# Patient Record
Sex: FEMALE | Race: WHITE | NOT HISPANIC OR LATINO | URBAN - NONMETROPOLITAN AREA
[De-identification: names, ages, dates, MRNs, and addresses within clinical notes are randomized per-mention and may not be internally consistent; named-entity substitution may affect disease eponyms.]

---
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
## 2025-03-10 SDOH — HEALTH STABILITY: PHYSICAL HEALTH: ON AVERAGE, HOW MANY DAYS PER WEEK DO YOU ENGAGE IN MODERATE TO STRENUOUS EXERCISE (LIKE A BRISK WALK)?: 6 DAYS

## 2025-03-10 SDOH — HEALTH STABILITY: PHYSICAL HEALTH: ON AVERAGE, HOW MANY MINUTES DO YOU ENGAGE IN EXERCISE AT THIS LEVEL?: 30 MIN

## 2025-03-10 ASSESSMENT — SOCIAL DETERMINANTS OF HEALTH (SDOH): HOW OFTEN DO YOU GET TOGETHER WITH FRIENDS OR RELATIVES?: MORE THAN THREE TIMES A WEEK

## 2025-03-11 ENCOUNTER — OFFICE VISIT (OUTPATIENT)
Dept: FAMILY MEDICINE | Facility: OTHER | Age: 20
End: 2025-03-11
Attending: PHYSICIAN ASSISTANT
Payer: COMMERCIAL

## 2025-03-11 VITALS
BODY MASS INDEX: 28.71 KG/M2 | SYSTOLIC BLOOD PRESSURE: 122 MMHG | DIASTOLIC BLOOD PRESSURE: 78 MMHG | OXYGEN SATURATION: 99 % | TEMPERATURE: 97.5 F | WEIGHT: 168.2 LBS | RESPIRATION RATE: 16 BRPM | HEIGHT: 64 IN | HEART RATE: 100 BPM

## 2025-03-11 DIAGNOSIS — Z00.00 ROUTINE HISTORY AND PHYSICAL EXAMINATION OF ADULT: Primary | ICD-10-CM

## 2025-03-11 DIAGNOSIS — L30.9 ECZEMA, UNSPECIFIED TYPE: ICD-10-CM

## 2025-03-11 DIAGNOSIS — R55 SYNCOPE, UNSPECIFIED SYNCOPE TYPE: ICD-10-CM

## 2025-03-11 ASSESSMENT — PAIN SCALES - GENERAL: PAINLEVEL_OUTOF10: NO PAIN (0)

## 2025-03-11 NOTE — PROGRESS NOTES
"Preventive Care Visit  [facility] [place]  Merly Denise PA-C, Family Medicine  Mar 11, 2025      Assessment & Plan     Routine history and physical examination of adult  Up-to-date on preventative screenings.  Patient declines STD screening.  Patient declined updating influenza, COVID, HPV vaccines, otherwise up-to-date on immunizations.  Reportedly had CBC, CMP, TSH completed through [facility] which were within normal limits.    Eczema, unspecified type  Symptoms and exam consistent with improving eczema.  Recommend good use of daily lotion.  If rash again flares consider topical steroid.      Syncope, unspecified syncope type  Syncopal episode occurred while in class last week as outlined below.  Evaluated through [facility] with negative lab work including CBC, CMP, TSH.  No recurrent symptoms since.  Reported no associated cardiac symptoms.  Discussed options including monitoring versus pursuing additional cardiac evaluation.  Patient and mother are in agreement with closely monitoring and if recurrent symptoms or new symptoms develop will pursue further evaluation.            BMI  Estimated body mass index is 29.1 kg/m  as calculated from the following:    Height as of this encounter: 1.619 m (5' 3.75\").    Weight as of this encounter: 76.3 kg (168 lb 3.2 oz).   Weight management plan: Discussed healthy diet and exercise guidelines    Counseling  Appropriate preventive services were addressed with this patient via screening, questionnaire, or discussion as appropriate for fall prevention, nutrition, physical activity, Tobacco-use cessation, social engagement, weight loss and cognition.  Checklist reviewing preventive services available has been given to the patient.  Reviewed patient's diet, addressing concerns and/or questions.           No follow-ups on file.    Rita Gama is a 19 year old, presenting for the following:  Physical        3/11/2025     8:34 AM   Additional " Questions   Roomed by Lolly Hagen LPN   Accompanied by self          HPI  Here for annual physical.  Patient reports she had a rash to her inner left elbow that has been present for the last few weeks.  Seems to be improving on its own.  Has not been using much over-the-counter treatment management.  Nearly resolved at this time.  When was present was a erythematous dry flaky rash.    Patient also had a syncopal episode while in class last week.  Patient does not recall any feeling of lightheaded or dizziness, cardiac symptoms prior to onset.  Was sitting at her desk and lost consciousness for few seconds.  Reports she did feel somewhat disoriented after regaining consciousness but otherwise felt physically fine.  She was evaluated by [facility] where she had lab work completed including CBC, CMP, TSH which were reportedly within normal limits.  EKG not completed.  Patient reports she is never had a prior episode similar to this and has not had any symptoms or recurrent syncopal episodes since.  No significant family history of heart disease, neurologic disorders.         Contraception: OCP  Risk for STI?: No  Last pap: Not indicated based on patient age and health status.   Any hx of abnormal paps:  N/A  FH of early CA?: No  Cholesterol/DM concerns/screening: NA  Tobacco?: No  Calcium intake: Dietary  DEXA: Not indicated based on patient age and health status.   Last mammo: Not indicated based on patient age and health status.   Colonoscopy: Not indicated based on patient age and health status.   Immunizations: HPV, COVID, Flu      Advance Care Planning  Patient does not have a Health Care Directive:       3/10/2025   General Health   How would you rate your overall physical health? Excellent    Feel stress (tense, anxious, or unable to sleep) Not at all        Proxy-reported         3/10/2025   Nutrition   Three or more servings of calcium each day? Yes    Diet: Regular (no restrictions)    How many servings  of fruit and vegetables per day? (!) 2-3    How many sweetened beverages each day? 0-1        Proxy-reported         3/10/2025   Exercise   Days per week of moderate/strenous exercise 6 days    Average minutes spent exercising at this level 30 min        Proxy-reported         3/10/2025   Social Factors   Frequency of gathering with friends or relatives More than three times a week    Worry food won't last until get money to buy more No    Food not last or not have enough money for food? No    Do you have housing? (Housing is defined as stable permanent housing and does not include staying ouside in a car, in a tent, in an abandoned building, in an overnight shelter, or couch-surfing.) Yes    Are you worried about losing your housing? No    Lack of transportation? No    Unable to get utilities (heat,electricity)? No        Proxy-reported         3/10/2025   Dental   Dentist two times every year? Yes        Proxy-reported           Today's PHQ-2 Score:       3/10/2025    10:20 AM   PHQ-2 ( 1999 [employer])   Q1: Little interest or pleasure in doing things 0    Q2: Feeling down, depressed or hopeless 0    PHQ-2 Score 0    Q1: Little interest or pleasure in doing things Not at all    Q2: Feeling down, depressed or hopeless Not at all    PHQ-2 Score 0        Proxy-reported           3/10/2025   Substance Use   Alcohol more than 3/day or more than 7/wk Not Applicable    Do you use any other substances recreationally? No        Proxy-reported     Social History     Tobacco Use    Smoking status: Never     Passive exposure: Never    Smokeless tobacco: Never   Vaping Use    Vaping status: Never Used   Substance Use Topics    Alcohol use: Never    Drug use: Never             3/10/2025   One time HIV Screening   Previous HIV test? No        Proxy-reported         3/10/2025   STI Screening   New sexual partner(s) since last STI/HIV test? No        Proxy-reported     History of abnormal Pap smear: No - under age 21, PAP not  "appropriate for age             3/10/2025   Contraception/Family Planning   Questions about contraception or family planning No        Proxy-reported        Reviewed and updated as needed this visit by Provider   Tobacco  Allergies  Meds  Problems  Med Hx  Surg Hx  Fam Hx            Past Medical History:   Diagnosis Date    Acne      Past Surgical History:   Procedure Laterality Date    OTHER SURGICAL HISTORY      2006,72827.0,CT CREATE EARDRUM OPENING GEN ANESTH     OB History    Para Term  AB Living   0 0 0 0 0 0   SAB IAB Ectopic Multiple Live Births   0 0 0 0 0            Objective    Exam  /78   Pulse 100   Temp 97.5  F (36.4  C) (Tympanic)   Resp 16   Ht 1.619 m (5' 3.75\")   Wt 76.3 kg (168 lb 3.2 oz)   LMP 2025 (Exact Date)   SpO2 99%   BMI 29.10 kg/m     Estimated body mass index is 29.1 kg/m  as calculated from the following:    Height as of this encounter: 1.619 m (5' 3.75\").    Weight as of this encounter: 76.3 kg (168 lb 3.2 oz).    Physical Exam  GENERAL: alert and no distress  EYES: Eyes grossly normal to inspection, PERRL and conjunctivae and sclerae normal  HENT: ear canals and TM's normal, nose and mouth without ulcers or lesions  RESP: lungs clear to auscultation - no rales, rhonchi or wheezes  CV: regular rate and rhythm, normal S1 S2, no S3 or S4, no murmur, click or rub, no peripheral edema  SKIN: no suspicious lesions or rashes and clearing mildly erythematous rash to ventral aspect of left elbow  PSYCH: mentation appears normal, affect normal/bright      Vision Screen  Vision Screen Details  Reason Vision Screen Not Completed: Screening Recommend: Patient/Guardian Declined (had eye exam last year)  Does the patient have corrective lenses (glasses/contacts)?: Yes    Hearing Screen           Signed Electronically by: Merly Denise PA-C    "

## 2025-03-11 NOTE — NURSING NOTE
"Chief Complaint   Patient presents with    Physical       Initial /78   Pulse 100   Temp 97.5  F (36.4  C) (Tympanic)   Resp 16   Ht 1.619 m (5' 3.75\")   Wt 76.3 kg (168 lb 3.2 oz)   LMP 02/25/2025 (Exact Date)   SpO2 99%   BMI 29.10 kg/m   Estimated body mass index is 29.1 kg/m  as calculated from the following:    Height as of this encounter: 1.619 m (5' 3.75\").    Weight as of this encounter: 76.3 kg (168 lb 3.2 oz).  Medication Review: complete    The next two questions are to help us understand your food security.  If you are feeling you need any assistance in this area, we have resources available to support you today.          3/10/2025   SDOH- Food Insecurity   Within the past 12 months, did you worry that your food would run out before you got money to buy more? N    Within the past 12 months, did the food you bought just not last and you didn t have money to get more? N        Proxy-reported         Health Care Directive:  Patient does not have a Health Care Directive: Discussed advance care planning with patient; however, patient declined at this time.    Lolly Hagen LPN      "

## 2025-06-18 DIAGNOSIS — L70.0 ACNE VULGARIS: ICD-10-CM

## 2025-06-18 RX ORDER — NORGESTREL AND ETHINYL ESTRADIOL 0.3-0.03MG
1 KIT ORAL DAILY
Qty: 84 TABLET | Refills: 3 | OUTPATIENT
Start: 2025-06-18

## 2025-06-18 RX ORDER — NORGESTREL-ETHINYL ESTRADIOL 0.3-0.03MG
1 TABLET ORAL DAILY
Qty: 84 TABLET | Refills: 3 | Status: SHIPPED | OUTPATIENT
Start: 2025-06-18

## 2025-08-25 ENCOUNTER — MYC REFILL (OUTPATIENT)
Dept: FAMILY MEDICINE | Facility: OTHER | Age: 20
End: 2025-08-25
Payer: COMMERCIAL

## 2025-08-25 DIAGNOSIS — L70.0 ACNE VULGARIS: ICD-10-CM

## 2025-08-25 RX ORDER — NORGESTREL-ETHINYL ESTRADIOL 0.3-0.03MG
1 TABLET ORAL DAILY
Qty: 84 TABLET | Refills: 3 | Status: CANCELLED | OUTPATIENT
Start: 2025-08-25

## 2025-08-25 RX ORDER — ADAPALENE 0.1 G/100G
CREAM TOPICAL
Qty: 45 G | Refills: 2 | Status: CANCELLED | OUTPATIENT
Start: 2025-08-25

## 2025-08-27 DIAGNOSIS — L70.0 ACNE VULGARIS: ICD-10-CM

## 2025-08-28 RX ORDER — MINOCYCLINE HYDROCHLORIDE 50 MG/1
50 CAPSULE ORAL 2 TIMES DAILY
Qty: 180 CAPSULE | Refills: 0 | Status: SHIPPED | OUTPATIENT
Start: 2025-08-28

## 2025-08-30 RX ORDER — MINOCYCLINE HYDROCHLORIDE 50 MG/1
50 CAPSULE ORAL 2 TIMES DAILY
Qty: 60 CAPSULE | Refills: 0 | Status: SHIPPED | OUTPATIENT
Start: 2025-08-30